# Patient Record
Sex: MALE | Race: WHITE | NOT HISPANIC OR LATINO | Employment: OTHER | URBAN - METROPOLITAN AREA
[De-identification: names, ages, dates, MRNs, and addresses within clinical notes are randomized per-mention and may not be internally consistent; named-entity substitution may affect disease eponyms.]

---

## 2018-05-18 ENCOUNTER — TRANSFERRED RECORDS (OUTPATIENT)
Dept: HEALTH INFORMATION MANAGEMENT | Facility: CLINIC | Age: 33
End: 2018-05-18

## 2018-06-05 ENCOUNTER — OFFICE VISIT (OUTPATIENT)
Dept: FAMILY MEDICINE | Facility: CLINIC | Age: 33
End: 2018-06-05
Payer: COMMERCIAL

## 2018-06-05 VITALS
WEIGHT: 181 LBS | DIASTOLIC BLOOD PRESSURE: 67 MMHG | BODY MASS INDEX: 25.91 KG/M2 | HEIGHT: 70 IN | TEMPERATURE: 96.8 F | SYSTOLIC BLOOD PRESSURE: 97 MMHG | OXYGEN SATURATION: 97 %

## 2018-06-05 DIAGNOSIS — Z01.818 PREOP GENERAL PHYSICAL EXAM: Primary | ICD-10-CM

## 2018-06-05 DIAGNOSIS — Z23 NEED FOR VACCINATION: ICD-10-CM

## 2018-06-05 DIAGNOSIS — Q18.1 CYST OF EAR CANAL: ICD-10-CM

## 2018-06-05 PROCEDURE — 99203 OFFICE O/P NEW LOW 30 MIN: CPT | Mod: 25 | Performed by: NURSE PRACTITIONER

## 2018-06-05 PROCEDURE — 90471 IMMUNIZATION ADMIN: CPT | Performed by: NURSE PRACTITIONER

## 2018-06-05 PROCEDURE — 90714 TD VACC NO PRESV 7 YRS+ IM: CPT | Performed by: NURSE PRACTITIONER

## 2018-06-05 RX ORDER — ALPRAZOLAM 0.25 MG
0.25 TABLET ORAL DAILY PRN
COMMUNITY
Start: 2016-02-04 | End: 2020-01-27

## 2018-06-05 RX ORDER — ISOTRETINOIN 40 MG/1
120 CAPSULE ORAL DAILY
COMMUNITY
End: 2020-01-27

## 2018-06-05 NOTE — PROGRESS NOTES
[place] [address] [facility] [address]  [phone]  Dept: [phone]    PRE-OP EVALUATION:  Today's date: 2018    Romario Horta (: 1985) presents for pre-operative evaluation assessment as requested by Dr. Sanjay Blackmon.  He requires evaluation and anesthesia risk assessment prior to undergoing surgery/procedure for treatment of cyst.    Proposed Surgery/ Procedure: remove cyst right earlobe  Date of Surgery/ Procedure: 2018  Time of Surgery/ Procedure:   Hospital/Surgical Facility: [facility]  Fax number for surgical facility: [phone]  Primary Physician: Ania Cortez  Type of Anesthesia Anticipated: to be determined    Patient has a Health Care Directive or Living Will:  NO    1. NO - Do you have a history of heart attack, stroke, stent, bypass or surgery on an artery in the head, neck, heart or legs?  2. NO - Do you ever have any pain or discomfort in your chest?  3. NO - Do you have a history of  Heart Failure?  4. NO - Are you troubled by shortness of breath when: walking on the level, up a slight hill or at night?  5. NO - Do you currently have a cold, bronchitis or other respiratory infection?  6. NO - Do you have a cough, shortness of breath or wheezing?  7. NO - Do you sometimes get pains in the calves of your legs when you walk?  8. NO - Do you or anyone in your family have previous history of blood clots?  9. NO - Do you or does anyone in your family have a serious bleeding problem such as prolonged bleeding following surgeries or cuts?  10. NO - Have you ever had problems with anemia or been told to take iron pills?  11. NO - Have you had any abnormal blood loss such as black, tarry or bloody stools, or abnormal vaginal bleeding?  12. NO - Have you ever had a blood transfusion?  13. NO - Have you or any of your relatives ever had problems with anesthesia?  14. NO - Do you have sleep apnea, excessive snoring or daytime  "drowsiness?  15. NO - Do you have any prosthetic heart valves?  16. NO - Do you have prosthetic joints?  17. NO - Is there any chance that you may be pregnant?      HPI:     HPI related to upcoming procedure: cyst present for a couple weeks in right ear canal. IS currently on antibiotics.   Has been on acutane for about 3 months     See problem list for active medical problems.  Problems all longstanding and stable, except as noted/documented.  See ROS for pertinent symptoms related to these conditions.                                                                                                                                                          .    MEDICAL HISTORY:     Patient Active Problem List    Diagnosis Date Noted     Cerumen impaction      Priority: Medium      Past Medical History:   Diagnosis Date     Cerumen impaction      No past surgical history on file.  No current outpatient prescriptions on file.     OTC products: None, except as noted above    Allergies   Allergen Reactions     No Known Allergies       Latex Allergy: NO    Social History   Substance Use Topics     Smoking status: Never Smoker     Smokeless tobacco: Not on file     Alcohol use No     History   Drug Use No       REVIEW OF SYSTEMS:   Constitutional, HEENT, cardiovascular, pulmonary, gi and gu systems are negative, except as otherwise noted.    EXAM:   BP 97/67 (Cuff Size: Adult Regular)  Temp 96.8  F (36  C) (Tympanic)  Ht 5' 10\" (1.778 m)  Wt 181 lb (82.1 kg)  SpO2 97%  BMI 25.97 kg/m2  GENERAL APPEARANCE: healthy, alert and no distress  HENT: ear canals and TM's normal and nose and mouth without ulcers or lesions--other than tiny cyst in right ear canal at entrance  RESP: lungs clear to auscultation - no rales, rhonchi or wheezes  CV: regular rate and rhythm, normal S1 S2, no S3 or S4 and no murmur, click or rub   NEURO: Normal strength and tone, sensory exam grossly normal, mentation intact and speech " normal    DIAGNOSTICS:   No labs or EKG required for low risk surgery (cataract, skin procedure, breast biopsy, etc)    Recent Labs   Lab Test  04/08/15   0834   HGB  15.0   PLT  253   NA  140   POTASSIUM  3.8   CR  0.99        IMPRESSION:   Reason for surgery/procedure: cyst in right ear  Diagnosis/reason for consult: medical preop    The proposed surgical procedure is considered LOW risk.    REVISED CARDIAC RISK INDEX  The patient has the following serious cardiovascular risks for perioperative complications such as (MI, PE, VFib and 3  AV Block):  No serious cardiac risks  INTERPRETATION: 0 risks: Class I (very low risk - 0.4% complication rate)    The patient has the following additional risks for perioperative complications:  No identified additional risks      ICD-10-CM    1. Preop general physical exam Z01.818    2. Cyst of ear canal Q18.1    3. Need for vaccination Z23 TD PRSERV FREE >=7 YRS ADS IM [54693]     1st  Administration  [48027]       RECOMMENDATIONS:         --Patient is to take all scheduled medications on the day of surgery EXCEPT for modifications listed below.      APPROVAL GIVEN to proceed with proposed procedure, without further diagnostic evaluation       Signed Electronically by: HARISH House CNP    Copy of this evaluation report is provided to requesting physician.    Ratna Preop Guidelines    Revised Cardiac Risk Index

## 2018-06-05 NOTE — MR AVS SNAPSHOT
After Visit Summary   6/5/2018    Romario Horta    MRN: [id number]           Patient Information     Date Of Birth          1985        Visit Information        Provider Department      6/5/2018 9:00 AM Rosemary Ritchie APRN CNP [facility]        Today's Diagnoses     Preop general physical exam    -  1    Cyst of ear canal        Need for vaccination          Care Instructions      Before Your Surgery      Call your surgeon if there is any change in your health. This includes signs of a cold or flu (such as a sore throat, runny nose, cough, rash or fever).    Do not smoke, drink alcohol or take over the counter medicine (unless your surgeon or primary care doctor tells you to) for the 24 hours before and after surgery.    If you take prescribed drugs: Follow your doctor s orders about which medicines to take and which to stop until after surgery.    Eating and drinking prior to surgery: follow the instructions from your surgeon    Take a shower or bath the night before surgery. Use the soap your surgeon gave you to gently clean your skin. If you do not have soap from your surgeon, use your regular soap. Do not shave or scrub the surgery site.  Wear clean pajamas and have clean sheets on your bed.           Follow-ups after your visit        Who to contact     If you have questions or need follow up information about today's clinic visit or your schedule please contact [facility] directly at [phone].  Normal or non-critical lab and imaging results will be communicated to you by MyChart, letter or phone within 4 business days after the clinic has received the results. If you do not hear from us within 7 days, please contact the clinic through MyChart or phone. If you have a critical or abnormal lab result, we will notify you by phone as soon as possible.  Submit refill requests through [employer] or call your pharmacy and they will forward the refill request to us.  "Please allow 3 business days for your refill to be completed.          Additional Information About Your Visit        MyChart Information     [employer] lets you send messages to your doctor, view your test results, renew your prescriptions, schedule appointments and more. To sign up, go to [email or web address] . Click on \"Log in\" on the left side of the screen, which will take you to the Welcome page. Then click on \"Sign up Now\" on the right side of the page.     You will be asked to enter the access code listed below, as well as some personal information. Please follow the directions to create your username and password.     Your access code is: [email or web address]  Expires: 9/3/2018  9:35 AM     Your access code will  in 90 days. If you need help or a new code, please call your [place] clinic or [phone].        Care EveryWhere ID     This is your [facility] EveryWhere ID. This could be used by other organizations to access your [place] medical records  [id number]        Your Vitals Were     Temperature Height Pulse Oximetry BMI (Body Mass Index)          96.8  F (36  C) (Tympanic) 5' 10\" (1.778 m) 97% 25.97 kg/m2         Blood Pressure from Last 3 Encounters:   18 97/67   10/01/14 103/64    Weight from Last 3 Encounters:   18 181 lb (82.1 kg)   10/01/14 171 lb (77.6 kg)              We Performed the Following     1st  Administration  [[id number]]     TD PRSERV FREE >=7 YRS ADS IM [[id number]]        Primary Care Provider Office Phone # Fax #    Ania Chanel DO [place] [phone] [address]  [place] [address]        Equal Access to Services     RAYNA ZEPEDA AH: Hadii luciano Echevarria, watamela polanco, qaneida kaalmada marge, barbara guajardo. So [facility] [phone].    ATENCIÓN: Si habla español, tiene a bustos disposición servicios gratuitos de asistencia lingüística. Llame al [phone].    We comply with applicable federal civil rights laws and " [place] laws. We do not discriminate on the basis of race, color, national origin, age, disability, sex, sexual orientation, or gender identity.            Thank you!     Thank you for choosing [facility]  for your care. Our goal is always to provide you with excellent care. Hearing back from our patients is one way we can continue to improve our services. Please take a few minutes to complete the written survey that you may receive in the mail after your visit with us. Thank you!             Your Updated Medication List - Protect others around you: Learn how to safely use, store and throw away your medicines at www.disposemymeds.org.          This list is accurate as of 6/5/18  9:35 AM.  Always use your most recent med list.                   Brand Name Dispense Instructions for use Diagnosis    ALPRAZolam 0.25 MG tablet    XANAX     Take 0.25 mg by mouth daily as needed        BACTRIM PO      Take 2 tablets by mouth 2 times daily        ISOtretinoin 40 MG capsule    ACCUTANE     Take 120 mg by mouth daily        XYZAL ALLERGY 24HR PO      Take 1 capsule by mouth daily as needed

## 2018-06-20 ENCOUNTER — TRANSFERRED RECORDS (OUTPATIENT)
Dept: HEALTH INFORMATION MANAGEMENT | Facility: CLINIC | Age: 33
End: 2018-06-20

## 2018-08-14 ENCOUNTER — HOSPITAL ENCOUNTER (EMERGENCY)
Facility: CLINIC | Age: 33
Discharge: HOME | End: 2018-08-15
Attending: EMERGENCY MEDICINE | Admitting: EMERGENCY MEDICINE
Payer: COMMERCIAL

## 2018-08-14 DIAGNOSIS — R07.9 CHEST PAIN, UNSPECIFIED TYPE: ICD-10-CM

## 2018-08-14 DIAGNOSIS — H92.01 EARACHE, RIGHT: ICD-10-CM

## 2018-08-14 LAB — INTERPRETATION ECG - MUSE: NORMAL

## 2018-08-14 PROCEDURE — 93005 ELECTROCARDIOGRAM TRACING: CPT

## 2018-08-14 PROCEDURE — 99285 EMERGENCY DEPT VISIT HI MDM: CPT | Mod: 25

## 2018-08-14 NOTE — ED AVS SNAPSHOT
Emergency Department    [address] [place] [address]    Phone:  [phone]    Fax:  [phone]                                       Romario Horta   MRN: [id number]    Department:   Emergency Department   Date of Visit:  8/14/2018           After Visit Summary Signature Page     I have received my discharge instructions, and my questions have been answered. I have discussed any challenges I see with this plan with the nurse or doctor.    ..........................................................................................................................................  Patient/Patient Representative Signature      ..........................................................................................................................................  Patient Representative Print Name and Relationship to Patient    ..................................................               ................................................  Date                                            Time    ..........................................................................................................................................  Reviewed by Signature/Title    ...................................................              ..............................................  Date                                                            Time

## 2018-08-14 NOTE — ED AVS SNAPSHOT
Emergency Department    [address] [place] [address]    Phone:  [phone]    Fax:  [phone]                                       Romario Horta   MRN: [id number]    Department:   Emergency Department   Date of Visit:  8/14/2018           Patient Information     Date Of Birth          1985        Your diagnoses for this visit were:     Chest pain, unspecified type     Earache, right        You were seen by Jose Boykin MD.      Follow-up Information     Please follow up.    Why:  return here if any concerns        Discharge Instructions          *CHEST PAIN, UNCERTAIN CAUSE    Based on your exam today, the exact cause of your chest pain is not certain. Your condition does not seem serious at this time, and your pain does not appear to be coming from your heart. However, sometimes the signs of a serious problem take more time to appear. Therefore, watch for the warning signs listed below.  HOME CARE:    1. Rest today and avoid strenuous activity.  2. Take any prescribed medicine as directed.  FOLLOW UP with your doctor in 1-3 days.   GET PROMPT MEDICAL ATTENTION if any of the following occur:    A change in the type of pain: if it feels different, becomes more severe, lasts longer, or begins to spread into your shoulder, arm, neck, jaw or back    Shortness of breath or increased pain with breathing    Weakness, dizziness, or fainting    Cough with blood or dark colored sputum (phlegm)    Fever over 101  F (38.3  C)    Swelling, pain or redness in one leg    7212-1957 [employer]. [address] [place] [address]. All rights reserved. This information is not intended as a substitute for professional medical care. Always follow your healthcare professional's instructions.  This information has been modified by your health care provider with permission from the publisher.        24 Hour Appointment Hotline       To make an appointment at any [facility]  clinic, call [phone] ([phone]). If you don't have a family doctor or clinic, we will help you find one. [facility] clinics are conveniently located to serve the needs of you and your family.             Review of your medicines      Our records show that you are taking the medicines listed below. If these are incorrect, please call your family doctor or clinic.        Dose / Directions Last dose taken    ALPRAZolam 0.25 MG tablet   Commonly known as:  XANAX   Dose:  0.25 mg        Take 0.25 mg by mouth daily as needed   Refills:  0        BACTRIM PO   Dose:  2 tablet        Take 2 tablets by mouth 2 times daily   Refills:  0        ISOtretinoin 40 MG capsule   Commonly known as:  ACCUTANE   Dose:  120 mg        Take 120 mg by mouth daily   Refills:  0        XYZAL ALLERGY 24HR PO   Dose:  1 capsule        Take 1 capsule by mouth daily as needed   Refills:  0                Procedures and tests performed during your visit     Basic metabolic panel    CBC with platelets differential    Chest XR,  PA & LAT    D dimer quantitative    EKG 12-lead, tracing only    Peripheral IV: Standard    Troponin I      Orders Needing Specimen Collection     None      Pending Results     Date and Time Order Name Status Description    8/15/2018 0038 Chest XR,  PA & LAT Preliminary             Pending Culture Results     No orders found for last 3 day(s).            Pending Results Instructions     If you had any lab results that were not finalized at the time of your Discharge, you can call the ED Lab Result RN at [phone]. You will be contacted by this team for any positive Lab results or changes in treatment. The nurses are available 7 days a week from 10A to 6:30P.  You can leave a message 24 hours per day and they will return your call.        Test Results From Your Hospital Stay        8/15/2018 12:20 AM      Component Results     Component Value Ref Range & Units Status    WBC 6.6 4.0 - 11.0 10e9/L Final    RBC  Count 4.83 4.4 - 5.9 10e12/L Final    Hemoglobin 14.0 13.3 - 17.7 g/dL Final    Hematocrit 41.6 40.0 - 53.0 % Final    MCV 86 78 - 100 fl Final    MCH 29.0 26.5 - 33.0 pg Final    MCHC 33.7 31.5 - 36.5 g/dL Final    RDW 12.8 10.0 - 15.0 % Final    Platelet Count 188 150 - 450 10e9/L Final    Diff Method Automated Method  Final    % Neutrophils 70.7 % Final    % Lymphocytes 19.1 % Final    % Monocytes 8.2 % Final    % Eosinophils 1.7 % Final    % Basophils 0.3 % Final    % Immature Granulocytes 0.0 % Final    Nucleated RBCs 0 0 /100 Final    Absolute Neutrophil 4.7 1.6 - 8.3 10e9/L Final    Absolute Lymphocytes 1.3 0.8 - 5.3 10e9/L Final    Absolute Monocytes 0.5 0.0 - 1.3 10e9/L Final    Absolute Eosinophils 0.1 0.0 - 0.7 10e9/L Final    Absolute Basophils 0.0 0.0 - 0.2 10e9/L Final    Abs Immature Granulocytes 0.0 0 - 0.4 10e9/L Final    Absolute Nucleated RBC 0.0  Final         8/15/2018 12:43 AM      Component Results     Component Value Ref Range & Units Status    Sodium 141 133 - 144 mmol/L Final    Potassium 3.5 3.4 - 5.3 mmol/L Final    Chloride 107 94 - 109 mmol/L Final    Carbon Dioxide 24 20 - 32 mmol/L Final    Anion Gap 10 3 - 14 mmol/L Final    Glucose 125 (H) 70 - 99 mg/dL Final    Urea Nitrogen 16 7 - 30 mg/dL Final    Creatinine 0.95 0.66 - 1.25 mg/dL Final    GFR Estimate >90 >60 mL/min/1.7m2 Final    Non  GFR Calc    GFR Estimate If Black >90 >60 mL/min/1.7m2 Final    African American GFR Calc    Calcium 8.2 (L) 8.5 - 10.1 mg/dL Final         8/15/2018 12:47 AM      Component Results     Component Value Ref Range & Units Status    Troponin I ES <0.015 0.000 - 0.045 ug/L Final    The 99th percentile for upper reference range is 0.045 ug/L.  Troponin values   in the range of 0.045 - 0.120 ug/L may be associated with risks of adverse   clinical events.           8/15/2018 12:36 AM      Component Results     Component Value Ref Range & Units Status    D Dimer <0.3 0.0 - 0.50 ug/ml FEU  Final    This D-dimer assay is intended for use in conjunction with a clinical pretest   probability assessment model to exclude pulmonary embolism (PE) and deep   venous thrombosis (DVT) in outpatients suspected of PE or DVT. The cut-off   value is 0.5 ug/mL FEU.           8/15/2018  1:10 AM      Narrative     CHEST 2 VIEWS   8/15/2018 1:01 AM     HISTORY: Chest pain.    COMPARISON: None.    FINDINGS: The lungs are clear. Normal-sized cardiac silhouette.        Impression     IMPRESSION: No evidence of active cardiopulmonary disease.                Clinical Quality Measure: Blood Pressure Screening     Your blood pressure was checked while you were in the emergency department today. The last reading we obtained was  BP: (!) 131/94 . Please read the guidelines below about what these numbers mean and what you should do about them.  If your systolic blood pressure (the top number) is less than 120 and your diastolic blood pressure (the bottom number) is less than 80, then your blood pressure is normal. There is nothing more that you need to do about it.  If your systolic blood pressure (the top number) is 120-139 or your diastolic blood pressure (the bottom number) is 80-89, your blood pressure may be higher than it should be. You should have your blood pressure rechecked within a year by a primary care provider.  If your systolic blood pressure (the top number) is 140 or greater or your diastolic blood pressure (the bottom number) is 90 or greater, you may have high blood pressure. High blood pressure is treatable, but if left untreated over time it can put you at risk for heart attack, stroke, or kidney failure. You should have your blood pressure rechecked by a primary care provider within the next 4 weeks.  If your provider in the emergency department today gave you specific instructions to follow-up with your doctor or provider even sooner than that, you should follow that instruction and not wait for up to 4  "weeks for your follow-up visit.        Thank you for choosing [facility]       Thank you for choosing [facility] for your care. Our goal is always to provide you with excellent care. Hearing back from our patients is one way we can continue to improve our services. Please take a few minutes to complete the written survey that you may receive in the mail after you visit with us. Thank you!        [facility] [employer] Information     [employer] lets you send messages to your doctor, view your test results, renew your prescriptions, schedule appointments and more. To sign up, go to [email or web address] . Click on \"Log in\" on the left side of the screen, which will take you to the Welcome page. Then click on \"Sign up Now\" on the right side of the page.     You will be asked to enter the access code listed below, as well as some personal information. Please follow the directions to create your username and password.     Your access code is: [email or web address]  Expires: 9/3/2018  9:35 AM     Your access code will  in 90 days. If you need help or a new code, please call your [facility] clinic or [phone].        Care EveryWhere ID     This is your Care EveryWhere ID. This could be used by other organizations to access your [facility] medical records  [id number]        Equal Access to Services     RAYNA ZEPEDA : Tamia Echevarria, waaxda luqadaha, qaybta kaalmada adeyasmeenyada, barbara guajardo. So [facility] [phone].    ATENCIÓN: Si habla español, tiene a bustos disposición servicios gratuitos de asistencia lingüística. Llame al [phone].    We comply with applicable federal civil rights laws and [place] laws. We do not discriminate on the basis of race, color, national origin, age, disability, sex, sexual orientation, or gender identity.            After Visit Summary       This is your record. Keep this with you and show to your community pharmacist(s) and doctor(s) at your next visit.                "

## 2018-08-15 ENCOUNTER — APPOINTMENT (OUTPATIENT)
Dept: GENERAL RADIOLOGY | Facility: CLINIC | Age: 33
End: 2018-08-15
Attending: EMERGENCY MEDICINE
Payer: COMMERCIAL

## 2018-08-15 VITALS
HEIGHT: 71 IN | HEART RATE: 97 BPM | SYSTOLIC BLOOD PRESSURE: 131 MMHG | DIASTOLIC BLOOD PRESSURE: 94 MMHG | TEMPERATURE: 99 F | BODY MASS INDEX: 25.2 KG/M2 | RESPIRATION RATE: 20 BRPM | WEIGHT: 180 LBS | OXYGEN SATURATION: 95 %

## 2018-08-15 LAB
ANION GAP SERPL CALCULATED.3IONS-SCNC: 10 MMOL/L (ref 3–14)
BASOPHILS # BLD AUTO: 0 10E9/L (ref 0–0.2)
BASOPHILS NFR BLD AUTO: 0.3 %
BUN SERPL-MCNC: 16 MG/DL (ref 7–30)
CALCIUM SERPL-MCNC: 8.2 MG/DL (ref 8.5–10.1)
CHLORIDE SERPL-SCNC: 107 MMOL/L (ref 94–109)
CO2 SERPL-SCNC: 24 MMOL/L (ref 20–32)
CREAT SERPL-MCNC: 0.95 MG/DL (ref 0.66–1.25)
D DIMER PPP FEU-MCNC: <0.3 UG/ML FEU (ref 0–0.5)
DIFFERENTIAL METHOD BLD: NORMAL
EOSINOPHIL # BLD AUTO: 0.1 10E9/L (ref 0–0.7)
EOSINOPHIL NFR BLD AUTO: 1.7 %
ERYTHROCYTE [DISTWIDTH] IN BLOOD BY AUTOMATED COUNT: 12.8 % (ref 10–15)
GFR SERPL CREATININE-BSD FRML MDRD: >90 ML/MIN/1.7M2
GLUCOSE SERPL-MCNC: 125 MG/DL (ref 70–99)
HCT VFR BLD AUTO: 41.6 % (ref 40–53)
HGB BLD-MCNC: 14 G/DL (ref 13.3–17.7)
IMM GRANULOCYTES # BLD: 0 10E9/L (ref 0–0.4)
IMM GRANULOCYTES NFR BLD: 0 %
LYMPHOCYTES # BLD AUTO: 1.3 10E9/L (ref 0.8–5.3)
LYMPHOCYTES NFR BLD AUTO: 19.1 %
MCH RBC QN AUTO: 29 PG (ref 26.5–33)
MCHC RBC AUTO-ENTMCNC: 33.7 G/DL (ref 31.5–36.5)
MCV RBC AUTO: 86 FL (ref 78–100)
MONOCYTES # BLD AUTO: 0.5 10E9/L (ref 0–1.3)
MONOCYTES NFR BLD AUTO: 8.2 %
NEUTROPHILS # BLD AUTO: 4.7 10E9/L (ref 1.6–8.3)
NEUTROPHILS NFR BLD AUTO: 70.7 %
NRBC # BLD AUTO: 0 10*3/UL
NRBC BLD AUTO-RTO: 0 /100
PLATELET # BLD AUTO: 188 10E9/L (ref 150–450)
POTASSIUM SERPL-SCNC: 3.5 MMOL/L (ref 3.4–5.3)
RBC # BLD AUTO: 4.83 10E12/L (ref 4.4–5.9)
SODIUM SERPL-SCNC: 141 MMOL/L (ref 133–144)
TROPONIN I SERPL-MCNC: <0.015 UG/L (ref 0–0.04)
WBC # BLD AUTO: 6.6 10E9/L (ref 4–11)

## 2018-08-15 PROCEDURE — 84484 ASSAY OF TROPONIN QUANT: CPT | Performed by: EMERGENCY MEDICINE

## 2018-08-15 PROCEDURE — 85379 FIBRIN DEGRADATION QUANT: CPT | Performed by: EMERGENCY MEDICINE

## 2018-08-15 PROCEDURE — 85025 COMPLETE CBC W/AUTO DIFF WBC: CPT | Performed by: EMERGENCY MEDICINE

## 2018-08-15 PROCEDURE — 71046 X-RAY EXAM CHEST 2 VIEWS: CPT

## 2018-08-15 PROCEDURE — 80048 BASIC METABOLIC PNL TOTAL CA: CPT | Performed by: EMERGENCY MEDICINE

## 2018-08-15 ASSESSMENT — ENCOUNTER SYMPTOMS
LIGHT-HEADEDNESS: 1
SHORTNESS OF BREATH: 1
NAUSEA: 0
MYALGIAS: 0
DIARRHEA: 0
VOMITING: 0
CONSTIPATION: 0
BLOOD IN STOOL: 0
ABDOMINAL PAIN: 0

## 2018-08-15 NOTE — ED PROVIDER NOTES
"  History   Chief Complaint:  Chest Pain     HPI   Romario Horta is an otherwise healthy 33 year old male who presents with chest pain. The patient reports that throughout the day he has had intermittent and alternating episodes of chills and diaphoreris, and has had a fever. At 1900 tonight, he states that he suddenly developed chest pain, lightheadedness, and shortness of breath directly after standing up. He notes that these symptoms are exacerbated when standing up. He describes the pain as being \"achy\" and constant diffusely throughout most of the chest. He adds that two hours ago he developed pain in his right ear. He did take ibuprofen for the symptoms, but reports that this did not help. The patient has not had any similar symptoms in the past. He does attest to having a stomach illness two weeks ago, although these symptoms have resolved. He traveled to [place] via airplane one week ago, although the travel time is only about one hour. The patient's grandfather  of a heart attack according to the patient's mother. The patient denies smoking, exercising, swelling to the arms or legs, pain to the arms or legs, abdominal pain, irregular bowel movements, nausea, and vomiting. He has no history of hypertension, hyperlipidemia, diabetes, and blood clots.     CARDIAC RISK FACTORS:  Sex:    Male  Tobacco:   No  Hypertension:   No  Hyperlipidemia:  No  Diabetes:   No  Family History:  Yes    PE/DVT RISK FACTORS:  Sex:    Male  Hormones:   No  Tobacco:   No  Cancer:   No  Travel:   Yes, one hour plane ride  Surgery:   No  Other immobilization: No  Personal history:  No  Family history:  No    Allergies:  No known drug allergies    Medications:    Xanax  Accutane    Past Medical History:    Cerumen impaction  Anxiety    Past Surgical History:    History reviewed. No pertinent surgical history.    Family History:    History reviewed. No pertinent family history.     Social History:  Smoking status: Never " "smoker  Alcohol use: Yes  Marital Status:   [2]    Review of Systems   HENT: Positive for ear pain (right ear).    Respiratory: Positive for shortness of breath.    Cardiovascular: Positive for chest pain. Negative for leg swelling.   Gastrointestinal: Negative for abdominal pain, blood in stool, constipation, diarrhea, nausea and vomiting.   Musculoskeletal: Negative for myalgias (arms or legs).   Neurological: Positive for light-headedness.   All other systems reviewed and are negative.    Physical Exam   Patient Vitals for the past 24 hrs:   BP Temp Temp src Pulse Heart Rate Resp SpO2 Height Weight   08/15/18 0134 (!) 131/94 - - - 86 20 - - -   08/15/18 0025 - - - - 95 10 95 % - -   08/15/18 0015 - - - - 95 19 96 % - -   08/14/18 2349 - - - - - - - 1.803 m (5' 11\") 81.6 kg (180 lb)   08/14/18 2342 (!) 131/94 99  F (37.2  C) Oral 97 - 20 - - -     Physical Exam  SKIN:  Warm, dry.  HEMATOLOGIC/IMMUNOLOGIC/LYMPHATIC:  No pallor. No limb edema.  HENT:  No JVD.  Right ear exam limited by cerumen obscuring the TM.    CARDIOVASCULAR:  Regular rate and rhythm, no murmur.  Radial and pedal pulses equal and normal.  RESPIRATORY:  No respiratory distress, breath sounds equal and normal.  GASTROINTESTINAL:  Soft, nontender abdomen.  No mass or distension.  MUSCULOSKELETAL:  Non-tender chest wall.  Normal body habitus.  NEUROLOGIC:  Alert, conversant.  PSYCHIATRIC:  Normal mood.    Emergency Department Course   ECG (23:39:53):  Rate 98 bpm. WY interval 150. QRS duration 104. QT/QTc 334/426. P-R-T axes 24 3 24. Normal sinus rhythm. Normal ECG. Interpreted at 2343 by Jose Boykin MD.    Imaging:  Radiographic findings were communicated with the patient who voiced understanding of the findings.    Chest XR, PA & LAT:  No evidence of active cardiopulmonary disease.  As read by Radiology.     Laboratory:  CBC: WNL (WBC 6.6, HGB 14.0, )  BMP:  (H), Calcium 8.2 (L) o/w WNL (Creatinine 0.95)  Troponin: " <0.015  D-dimer: <0.3    Emergency Department Course:  Past medical records, nursing notes, and vitals reviewed.  2344: I performed an exam of the patient and obtained history, as documented above.  IV inserted and blood drawn.  EKG obtained, results above.  The patient was sent for a chest x-ray, PA, and LAT while in the emergency department, findings above.    0059: I rechecked the patient. Explained findings to patient.    0012: I rechecked the patient. Explained x-ray findings to the patient.    Findings and plan explained to the patient. Patient discharged home with instructions regarding supportive care, medications, and reasons to return. The importance of close follow-up was reviewed.     Impression & Plan    Medical Decision Making:  Romario Horta is a 33 year old male who presents with chest discomfort that he describes as being worsened when he changes positions, specifically rising. Also complaining of some sort of right ear discomfort. The ear exam is somewhat limited as there is a fair amount of cerumen obscuring the tympanic membrane. The above evaluation otherwise was reassuring with respect to chest discomfort. Given the positional nature and negative workup I did not think he required further evaluation. Advised to return if any concerns with respect to either issue.     Diagnosis:    ICD-10-CM   1. Chest pain, unspecified type R07.9   2. Earache, right H92.01       Disposition:  Discharged to home.      John Richardson  8/14/2018    EMERGENCY DEPARTMENT  I, John Richardson, am serving as a scribe at 11:44 PM on 8/14/2018 to document services personally performed by Jose Boykin MD based on my observations and the provider's statements to me.        Jose Boykin MD  08/15/18 0329

## 2018-08-15 NOTE — DISCHARGE INSTRUCTIONS
*CHEST PAIN, UNCERTAIN CAUSE    Based on your exam today, the exact cause of your chest pain is not certain. Your condition does not seem serious at this time, and your pain does not appear to be coming from your heart. However, sometimes the signs of a serious problem take more time to appear. Therefore, watch for the warning signs listed below.  HOME CARE:    1. Rest today and avoid strenuous activity.  2. Take any prescribed medicine as directed.  FOLLOW UP with your doctor in 1-3 days.   GET PROMPT MEDICAL ATTENTION if any of the following occur:    A change in the type of pain: if it feels different, becomes more severe, lasts longer, or begins to spread into your shoulder, arm, neck, jaw or back    Shortness of breath or increased pain with breathing    Weakness, dizziness, or fainting    Cough with blood or dark colored sputum (phlegm)    Fever over 101  F (38.3  C)    Swelling, pain or redness in one leg    5401-2396 [employer] [facility]. [address] [place] [address]. All rights reserved. This information is not intended as a substitute for professional medical care. Always follow your healthcare professional's instructions.  This information has been modified by your health care provider with permission from the publisher.

## 2018-08-16 ENCOUNTER — OFFICE VISIT (OUTPATIENT)
Dept: FAMILY MEDICINE | Facility: CLINIC | Age: 33
End: 2018-08-16
Payer: COMMERCIAL

## 2018-08-16 VITALS
SYSTOLIC BLOOD PRESSURE: 127 MMHG | OXYGEN SATURATION: 97 % | HEIGHT: 71 IN | HEART RATE: 101 BPM | BODY MASS INDEX: 25.48 KG/M2 | TEMPERATURE: 100.3 F | WEIGHT: 182 LBS | DIASTOLIC BLOOD PRESSURE: 77 MMHG

## 2018-08-16 DIAGNOSIS — R07.9 ACUTE CHEST PAIN: ICD-10-CM

## 2018-08-16 DIAGNOSIS — R50.9 FEVER, UNSPECIFIED FEVER CAUSE: Primary | ICD-10-CM

## 2018-08-16 LAB
ALBUMIN SERPL-MCNC: 3.9 G/DL (ref 3.4–5)
ALP SERPL-CCNC: 99 U/L (ref 40–150)
ALT SERPL W P-5'-P-CCNC: 22 U/L (ref 0–70)
ANION GAP SERPL CALCULATED.3IONS-SCNC: 9 MMOL/L (ref 3–14)
AST SERPL W P-5'-P-CCNC: 20 U/L (ref 0–45)
BASOPHILS # BLD AUTO: 0 10E9/L (ref 0–0.2)
BASOPHILS NFR BLD AUTO: 0.2 %
BILIRUB SERPL-MCNC: 0.5 MG/DL (ref 0.2–1.3)
BUN SERPL-MCNC: 13 MG/DL (ref 7–30)
CALCIUM SERPL-MCNC: 8.9 MG/DL (ref 8.5–10.1)
CHLORIDE SERPL-SCNC: 105 MMOL/L (ref 94–109)
CO2 SERPL-SCNC: 26 MMOL/L (ref 20–32)
CREAT SERPL-MCNC: 1.1 MG/DL (ref 0.66–1.25)
DIFFERENTIAL METHOD BLD: NORMAL
EOSINOPHIL # BLD AUTO: 0 10E9/L (ref 0–0.7)
EOSINOPHIL NFR BLD AUTO: 0.3 %
ERYTHROCYTE [DISTWIDTH] IN BLOOD BY AUTOMATED COUNT: 13.3 % (ref 10–15)
GFR SERPL CREATININE-BSD FRML MDRD: 76 ML/MIN/1.7M2
GLUCOSE SERPL-MCNC: 96 MG/DL (ref 70–99)
HCT VFR BLD AUTO: 46.2 % (ref 40–53)
HGB BLD-MCNC: 15 G/DL (ref 13.3–17.7)
LYMPHOCYTES # BLD AUTO: 0.8 10E9/L (ref 0.8–5.3)
LYMPHOCYTES NFR BLD AUTO: 13.1 %
MCH RBC QN AUTO: 29.4 PG (ref 26.5–33)
MCHC RBC AUTO-ENTMCNC: 32.5 G/DL (ref 31.5–36.5)
MCV RBC AUTO: 90 FL (ref 78–100)
MONOCYTES # BLD AUTO: 0.6 10E9/L (ref 0–1.3)
MONOCYTES NFR BLD AUTO: 9.5 %
NEUTROPHILS # BLD AUTO: 4.5 10E9/L (ref 1.6–8.3)
NEUTROPHILS NFR BLD AUTO: 76.9 %
PLATELET # BLD AUTO: 191 10E9/L (ref 150–450)
POTASSIUM SERPL-SCNC: 3.8 MMOL/L (ref 3.4–5.3)
PROT SERPL-MCNC: 7.3 G/DL (ref 6.8–8.8)
RBC # BLD AUTO: 5.1 10E12/L (ref 4.4–5.9)
SODIUM SERPL-SCNC: 140 MMOL/L (ref 133–144)
WBC # BLD AUTO: 5.9 10E9/L (ref 4–11)

## 2018-08-16 PROCEDURE — 85025 COMPLETE CBC W/AUTO DIFF WBC: CPT | Performed by: NURSE PRACTITIONER

## 2018-08-16 PROCEDURE — 93000 ELECTROCARDIOGRAM COMPLETE: CPT | Performed by: NURSE PRACTITIONER

## 2018-08-16 PROCEDURE — 80053 COMPREHEN METABOLIC PANEL: CPT | Performed by: NURSE PRACTITIONER

## 2018-08-16 PROCEDURE — 36415 COLL VENOUS BLD VENIPUNCTURE: CPT | Performed by: NURSE PRACTITIONER

## 2018-08-16 PROCEDURE — 86618 LYME DISEASE ANTIBODY: CPT | Performed by: NURSE PRACTITIONER

## 2018-08-16 PROCEDURE — 99214 OFFICE O/P EST MOD 30 MIN: CPT | Performed by: NURSE PRACTITIONER

## 2018-08-16 PROCEDURE — 87040 BLOOD CULTURE FOR BACTERIA: CPT | Performed by: NURSE PRACTITIONER

## 2018-08-16 PROCEDURE — 86666 EHRLICHIA ANTIBODY: CPT | Mod: 90 | Performed by: NURSE PRACTITIONER

## 2018-08-16 PROCEDURE — 99000 SPECIMEN HANDLING OFFICE-LAB: CPT | Performed by: NURSE PRACTITIONER

## 2018-08-16 ASSESSMENT — ENCOUNTER SYMPTOMS
COUGH: 0
SHORTNESS OF BREATH: 1
DIARRHEA: 0
HEADACHES: 1
MYALGIAS: 1
NECK PAIN: 0
ORTHOPNEA: 0
NAUSEA: 0
CHILLS: 1
CONSTIPATION: 1
SORE THROAT: 0
FEVER: 1

## 2018-08-16 ASSESSMENT — LIFESTYLE VARIABLES: SUBSTANCE_ABUSE: 0

## 2018-08-16 NOTE — MR AVS SNAPSHOT
"              After Visit Summary   2018    Romario Horta    MRN: [id number]           Patient Information     Date Of Birth          1985        Visit Information        Provider Department      2018 9:30 AM Rosemary Ritchie APRN CNP [facility]        Today's Diagnoses     Fever, unspecified fever cause    -  1    Acute chest pain           Follow-ups after your visit        Who to contact     If you have questions or need follow up information about today's clinic visit or your schedule please contact [facility] directly at [phone].  Normal or non-critical lab and imaging results will be communicated to you by Werdsmithhart, letter or phone within 4 business days after the clinic has received the results. If you do not hear from us within 7 days, please contact the clinic through Werdsmithhart or phone. If you have a critical or abnormal lab result, we will notify you by phone as soon as possible.  Submit refill requests through SocialFlow or call your pharmacy and they will forward the refill request to us. Please allow 3 business days for your refill to be completed.          Additional Information About Your Visit        MyChart Information     SocialFlow lets you send messages to your doctor, view your test results, renew your prescriptions, schedule appointments and more. To sign up, go to [email or web address] . Click on \"Log in\" on the left side of the screen, which will take you to the Welcome page. Then click on \"Sign up Now\" on the right side of the page.     You will be asked to enter the access code listed below, as well as some personal information. Please follow the directions to create your username and password.     Your access code is: [email or web address]  Expires: 9/3/2018  9:35 AM     Your access code will  in 90 days. If you need help or a new code, please call your [facility] or [phone].        Care EveryWhere ID     This is your Care EveryWhere ID. " "This could be used by other organizations to access your [place] medical records  [id number]        Your Vitals Were     Pulse Temperature Height Pulse Oximetry BMI (Body Mass Index)       101 100.3  F (37.9  C) (Tympanic) 5' 11\" (1.803 m) 97% 25.38 kg/m2        Blood Pressure from Last 3 Encounters:   08/16/18 127/77   08/15/18 (!) 131/94   06/05/18 97/67    Weight from Last 3 Encounters:   08/16/18 182 lb (82.6 kg)   08/14/18 180 lb (81.6 kg)   06/05/18 181 lb (82.1 kg)              We Performed the Following     Anaplasma phagocytoph antibody IgG IgM     Blood culture     Blood culture     CBC with platelets and differential     Comprehensive metabolic panel     EKG 12-lead complete w/read - Clinics     JUST IN CASE     Lyme Disease Tabitha with reflex to WB Serum          Today's Medication Changes          These changes are accurate as of 8/16/18  1:22 PM.  If you have any questions, ask your nurse or doctor.               Stop taking these medicines if you haven't already. Please contact your care team if you have questions.     BACTRIM PO   Stopped by:  Rosemary Ritchie APRN CNP                    Primary Care Provider Office Phone # Fax #    Ania Chanel Cortez,  [phone] [address] [address]        Equal Access to Services     RAYNA ZEPEDA : Hadii luciano redmond hadasho Soedmundali, waaxda luqadaha, qaybta kaalmada barbara bird . So [facility] [phone].    ATENCIÓN: Si habla español, tiene a bustos disposición servicios gratuitos de asistencia lingüística. Simi al [phone].    We comply with applicable federal civil rights laws and [place] laws. We do not discriminate on the basis of race, color, national origin, age, disability, sex, sexual orientation, or gender identity.            Thank you!     Thank you for choosing [facility]  for your care. Our goal is always to provide you with excellent care. Hearing back from " our patients is one way we can continue to improve our services. Please take a few minutes to complete the written survey that you may receive in the mail after your visit with us. Thank you!             Your Updated Medication List - Protect others around you: Learn how to safely use, store and throw away your medicines at www.disposemymeds.org.          This list is accurate as of 8/16/18  1:22 PM.  Always use your most recent med list.                   Brand Name Dispense Instructions for use Diagnosis    ALPRAZolam 0.25 MG tablet    XANAX     Take 0.25 mg by mouth daily as needed        ISOtretinoin 40 MG capsule    ACCUTANE     Take 120 mg by mouth daily        XYZAL ALLERGY 24HR PO      Take 1 capsule by mouth daily as needed

## 2018-08-16 NOTE — PROGRESS NOTES
HPI      SUBJECTIVE:   Romario Horta is a 33 year old male who presents to clinic today for the following health issues:    ED/UC Followup:    Facility:   ED  Date of visit: 8/14/2018  Reason for visit: Chest Pain, Ear Ache  Current Status: Has a fever, still has chest pain and ear ache, feels ill.        2 nights ago started getting chest/lung pain. Then shortly after started getting ear pain and chills.  Temp was 101.9   Went to ER    If he lays down he feels pretty good, but if he gets up then he gets the ear pain or the chest pain   Feels more sick today   Mild HA  Taking Ibuprofen and ASA   Mild SOB with ambulation and only with pain   CP was much worse yesterday but continues today   A couple weeks ago had a stomach virus    Past Medical History:   Diagnosis Date     Cerumen impaction      No family history on file.  No past surgical history on file.  Social History   Substance Use Topics     Smoking status: Never Smoker     Smokeless tobacco: Never Used     Alcohol use Yes      Comment: abt 2 drinks per week      Current Outpatient Prescriptions   Medication Sig Dispense Refill     ALPRAZolam (XANAX) 0.25 MG tablet Take 0.25 mg by mouth daily as needed       ISOtretinoin (ACCUTANE) 40 MG capsule Take 120 mg by mouth daily       Levocetirizine Dihydrochloride (XYZAL ALLERGY 24HR PO) Take 1 capsule by mouth daily as needed       Sulfamethoxazole-Trimethoprim (BACTRIM PO) Take 2 tablets by mouth 2 times daily       No Known Allergies    Reviewed and updated as needed this visit by clinical staff and provider       Review of Systems   Constitutional: Positive for chills and fever.   HENT: Positive for ear pain (right). Negative for sore throat.    Respiratory: Positive for shortness of breath (mild). Negative for cough.    Cardiovascular: Positive for chest pain. Negative for orthopnea.   Gastrointestinal: Positive for constipation (mild, chronic). Negative for diarrhea and nausea.   Genitourinary: Negative.   "  Musculoskeletal: Positive for myalgias. Negative for neck pain.   Skin: Negative for rash.   Neurological: Positive for headaches.   Endo/Heme/Allergies: Negative for environmental allergies (not currently).   Psychiatric/Behavioral: Negative for substance abuse.          /77 (BP Location: Right arm, Cuff Size: Adult Regular)  Pulse 101  Temp 100.3  F (37.9  C) (Tympanic)  Ht 5' 11\" (1.803 m)  Wt 182 lb (82.6 kg)  SpO2 97%  BMI 25.38 kg/m2      Physical Exam   Constitutional: He is well-developed, well-nourished, and in no distress.   HENT:   Head: Normocephalic.   Right Ear: Tympanic membrane, external ear and ear canal normal.   Left Ear: Tympanic membrane, external ear and ear canal normal.   Mouth/Throat: Oropharynx is clear and moist. No oropharyngeal exudate.   Eyes: Conjunctivae are normal.   Neck: Normal range of motion.   Cardiovascular: Normal rate, regular rhythm and normal heart sounds.    No murmur heard.  Pulmonary/Chest: Effort normal and breath sounds normal. No respiratory distress.   Abdominal: Soft. He exhibits no distension. There is no tenderness.   Musculoskeletal: Normal range of motion.   Lymphadenopathy:     He has no cervical adenopathy.   Neurological: He is alert.   Skin: Skin is warm and dry.   Psychiatric: Mood and affect normal.   Vitals reviewed.      Assessment and Plan:       ICD-10-CM    1. Fever, unspecified fever cause R50.9 CBC with platelets and differential     Lyme Disease Tabitha with reflex to WB Serum     Anaplasma phagocytoph antibody IgG IgM     JUST IN CASE     Blood culture     Blood culture     Comprehensive metabolic panel   2. Acute chest pain R07.9 EKG 12-lead complete w/read - Clinics     Comprehensive metabolic panel     Unknown etiology of fever, CP and right ear pain.  Yesterday was seen in ED where he had neg d-dimer, trop, EKG, labs and CXR.   This is  Unlikely myocarditis, pericarditis, PE, pneumonia with previous findings. Not c/w meningitis with " no neck pain and only a mild HA and his main complaint not c/w meningitis.   WBC today is normal. With high fever, blood cultures completed today    EKG without acute findings   Tick borne illness testing pending   At this time will have him push fluids, take tyl or ibu prn   If any new symptoms develop, he knows he must go to the ER       HARISH Sanderson, CNP  [facility]

## 2018-08-17 LAB — B BURGDOR IGG+IGM SER QL: 0.07 (ref 0–0.89)

## 2018-08-19 LAB
A PHAGOCYTOPH IGG TITR SER IF: NORMAL {TITER}
A PHAGOCYTOPH IGM TITR SER IF: NORMAL {TITER}

## 2018-08-20 ENCOUNTER — OFFICE VISIT (OUTPATIENT)
Dept: FAMILY MEDICINE | Facility: CLINIC | Age: 33
End: 2018-08-20
Payer: COMMERCIAL

## 2018-08-20 VITALS
BODY MASS INDEX: 24.69 KG/M2 | SYSTOLIC BLOOD PRESSURE: 114 MMHG | HEART RATE: 75 BPM | WEIGHT: 177 LBS | TEMPERATURE: 97.9 F | DIASTOLIC BLOOD PRESSURE: 82 MMHG | OXYGEN SATURATION: 97 %

## 2018-08-20 DIAGNOSIS — B34.9 VIRAL SYNDROME: Primary | ICD-10-CM

## 2018-08-20 LAB
DEPRECATED S PYO AG THROAT QL EIA: NORMAL
HETEROPH AB SER QL: NEGATIVE
SPECIMEN SOURCE: NORMAL

## 2018-08-20 PROCEDURE — 36415 COLL VENOUS BLD VENIPUNCTURE: CPT | Performed by: NURSE PRACTITIONER

## 2018-08-20 PROCEDURE — 86308 HETEROPHILE ANTIBODY SCREEN: CPT | Performed by: NURSE PRACTITIONER

## 2018-08-20 PROCEDURE — 99213 OFFICE O/P EST LOW 20 MIN: CPT | Performed by: NURSE PRACTITIONER

## 2018-08-20 PROCEDURE — 86665 EPSTEIN-BARR CAPSID VCA: CPT | Performed by: NURSE PRACTITIONER

## 2018-08-20 PROCEDURE — 87081 CULTURE SCREEN ONLY: CPT | Performed by: NURSE PRACTITIONER

## 2018-08-20 PROCEDURE — 87880 STREP A ASSAY W/OPTIC: CPT | Performed by: NURSE PRACTITIONER

## 2018-08-20 NOTE — LETTER
[place] [address] [facility]  [address]  [place]  [address]  Tel: [phone]    August 23, 2018    Romario MILE Horta  [address] [facility] [address]        Dear Kahlil Mychal,    Enclosed are your results.     If you have any further questions or problems, please contact our office.      Sincerely,    Joana Tenorio NP/ Deja Nunez, CMA  Results for orders placed or performed in visit on 08/20/18   Mononucleosis screen   Result Value Ref Range    Mononucleosis Screen Negative NEG^Negative   EBV Capsid Antibody IgM   Result Value Ref Range    EBV Capsid Antibody IgM <0.2 0.0 - 0.8 AI   Strep, Rapid Screen   Result Value Ref Range    Specimen Description Throat     Rapid Strep A Screen       NEGATIVE: No Group A streptococcal antigen detected by immunoassay, await culture report.   Beta strep group A culture   Result Value Ref Range    Specimen Description Throat     Culture Micro No beta hemolytic Streptococcus Group A isolated                Enclosure: Lab Results

## 2018-08-20 NOTE — MR AVS SNAPSHOT
After Visit Summary   8/20/2018    Romario Horta    MRN: [id number]           Patient Information     Date Of Birth          1985        Visit Information        Provider Department      8/20/2018 10:00 AM Joana Tenorio APRN CNP [facility]        Today's Diagnoses     Viral syndrome    -  1       Follow-ups after your visit        Follow-up notes from your care team     Return if symptoms worsen or fail to improve.      Who to contact     If you have questions or need follow up information about today's clinic visit or your schedule please contact [facility] directly at [phone].  Normal or non-critical lab and imaging results will be communicated to you by MyChart, letter or phone within 4 business days after the clinic has received the results. If you do not hear from us within 7 days, please contact the clinic through Estechhart or phone. If you have a critical or abnormal lab result, we will notify you by phone as soon as possible.  Submit refill requests through NaviExpert or call your pharmacy and they will forward the refill request to us. Please allow 3 business days for your refill to be completed.          Additional Information About Your Visit        MyChart Information     NaviExpert gives you secure access to your electronic health record. If you see a primary care provider, you can also send messages to your care team and make appointments. If you have questions, please call your primary care clinic.  If you do not have a primary care provider, please call [phone] and they will assist you.        Care EveryWhere ID     This is your Care EveryWhere ID. This could be used by other organizations to access your [place] medical records  [id number]        Your Vitals Were     Pulse Temperature Pulse Oximetry BMI (Body Mass Index)          75 97.9  F (36.6  C) (Oral) 97% 24.69 kg/m2         Blood Pressure from Last 3 Encounters:   08/20/18 114/82   08/16/18  127/77   08/15/18 (!) 131/94    Weight from Last 3 Encounters:   08/20/18 177 lb (80.3 kg)   08/16/18 182 lb (82.6 kg)   08/14/18 180 lb (81.6 kg)              We Performed the Following     Beta strep group A culture     EBV Capsid Antibody IgM     Mononucleosis screen     Strep, Rapid Screen        Primary Care Provider Office Phone # Fax #    Ania Cortez,  [phone] [address]  [facility] [address]        Equal Access to Services     [facility]: Hadii aad ku hadasho Soomaali, waaxda luqadaha, qaybta kaalmada adeegyada, waxvinay colin . So [facility] [phone].    ATENCIÓN: Si habla español, tiene a bustos disposición servicios gratuitos de asistencia lingüística. [facility] [phone].    We comply with applicable federal civil rights laws and [place] laws. We do not discriminate on the basis of race, color, national origin, age, disability, sex, sexual orientation, or gender identity.            Thank you!     Thank you for choosing [facility]  for your care. Our goal is always to provide you with excellent care. Hearing back from our patients is one way we can continue to improve our services. Please take a few minutes to complete the written survey that you may receive in the mail after your visit with us. Thank you!             Your Updated Medication List - Protect others around you: Learn how to safely use, store and throw away your medicines at www.disposemymeds.org.          This list is accurate as of 8/20/18 11:59 PM.  Always use your most recent med list.                   Brand Name Dispense Instructions for use Diagnosis    ALPRAZolam 0.25 MG tablet    XANAX     Take 0.25 mg by mouth daily as needed        ISOtretinoin 40 MG capsule    ACCUTANE     Take 120 mg by mouth daily        XYZAL ALLERGY 24HR PO      Take 1 capsule by mouth daily as needed

## 2018-08-20 NOTE — PROGRESS NOTES
SUBJECTIVE:   Romario Horta is a 33 year old male who presents to clinic today for the following health issues:      RESPIRATORY SYMPTOMS      Duration: 6 days duration of fever, sore throat, ear pain and fatigue, He was seen in ED and subsequently here in clinic and dx was viral illness.  All symptoms are improving and he no longer has chest pain,  but he has continued to consult multiple medical acquaintances and is here today specifically to be tested for mono.  Also reports that he was not tested for strep and would like to be.     Description  Fever subsided and ear pain bilateral    Severity: moderate, feels he has been improving gradually, afebrile now, ears not as bad    But throat still very sore     Accompanying signs and symptoms: None    History (predisposing factors):  none    Precipitating or alleviating factors: None    Therapies tried and outcome:  None    Has been speaking to various physician relatives and primary providers and they have suggested mono as a dx; he was dx with mono 6 yrs ago    Requesting Mono test      Problem list and histories reviewed & adjusted, as indicated.  Additional history: as documented    Patient Active Problem List   Diagnosis     Cerumen impaction     No past surgical history on file.    Social History   Substance Use Topics     Smoking status: Never Smoker     Smokeless tobacco: Never Used     Alcohol use Yes      Comment: abt 2 drinks per week      No family history on file.      Current Outpatient Prescriptions   Medication Sig Dispense Refill     ALPRAZolam (XANAX) 0.25 MG tablet Take 0.25 mg by mouth daily as needed       ISOtretinoin (ACCUTANE) 40 MG capsule Take 120 mg by mouth daily       Levocetirizine Dihydrochloride (XYZAL ALLERGY 24HR PO) Take 1 capsule by mouth daily as needed       No Known Allergies    Reviewed and updated as needed this visit by clinical staff  Allergies       Reviewed and updated as needed this visit by Provider          ROS:  Constitutional, HEENT, cardiovascular, pulmonary, gi and gu systems are negative, except as otherwise noted.    OBJECTIVE:     /82 (BP Location: Left arm, Patient Position: Sitting, Cuff Size: Adult Regular)  Pulse 75  Temp 97.9  F (36.6  C) (Oral)  Wt 177 lb (80.3 kg)  SpO2 97%  BMI 24.69 kg/m2  Body mass index is 24.69 kg/(m^2).    GENERAL:alert and moderate distress, afebrile, 5# weight loss  EYES: Eyes grossly normal to inspection, PERRL and conjunctivae and sclerae normal  HENT: ear canals and TM's normal, nose and mouth without ulcers or lesions, lips appear dry, tonsills enlarged but not beefy and no exudate  NECK: no adenopathy, no asymmetry, masses, or scars and thyroid normal to palpation  RESP: lungs clear to auscultation - no rales, rhonchi or wheezes  CV: regular rate and rhythm, normal S1 S2, no S3 or S4, no murmur, click or rub, no peripheral edema   ABDOMEN: soft, nontender, no hepatosplenomegaly, no masses and bowel sounds normal  MS: no gross musculoskeletal defects noted, no edema    Diagnostic Test Results:  Results for orders placed or performed in visit on 08/20/18   Mononucleosis screen   Result Value Ref Range    Mononucleosis Screen Negative NEG^Negative   EBV Capsid Antibody IgM   Result Value Ref Range    EBV Capsid Antibody IgM <0.2 0.0 - 0.8 AI   Strep, Rapid Screen   Result Value Ref Range    Specimen Description Throat     Rapid Strep A Screen       NEGATIVE: No Group A streptococcal antigen detected by immunoassay, await culture report.   Beta strep group A culture   Result Value Ref Range    Specimen Description Throat     Culture Micro No beta hemolytic Streptococcus Group A isolated      ASSESSMENT/PLAN:         ICD-10-CM    1. Viral syndrome B34.9 Mononucleosis screen     EBV Capsid Antibody IgM     Strep, Rapid Screen     Beta strep group A culture       [facility]AR[facility]

## 2018-08-21 ENCOUNTER — TELEPHONE (OUTPATIENT)
Dept: FAMILY MEDICINE | Facility: CLINIC | Age: 33
End: 2018-08-21

## 2018-08-21 LAB
BACTERIA SPEC CULT: NORMAL
EBV VCA IGM SER QL IA: <0.2 AI (ref 0–0.8)
SPECIMEN SOURCE: NORMAL

## 2018-08-21 NOTE — TELEPHONE ENCOUNTER
Reason for Call:  Request for results:    Name of test or procedure: 8/20    Date of test of procedure: Labs    Location of the test or procedure: [facility]    OK to leave the result message on voice mail or with a family member? YES    Phone number Patient can be reached at:  Home number on file [phone] (home)    Additional comments: was told he would hear from us yesterday about his results    Call taken on 8/21/2018 at 4:45 PM by Ruslan Nagel

## 2018-08-22 LAB
BACTERIA SPEC CULT: NO GROWTH
BACTERIA SPEC CULT: NO GROWTH
Lab: NORMAL
Lab: NORMAL
SPECIMEN SOURCE: NORMAL
SPECIMEN SOURCE: NORMAL

## 2018-08-23 NOTE — PROGRESS NOTES
Romario  All of your labs have returned negative. I hope you are feeling better. I see you saw my colleague for these symptoms as well. Please follow up if you are still feeling ill   Rosemary

## 2018-09-06 NOTE — NURSING NOTE
Screening Questionnaire for Adult Immunization     Are you sick today?   No    Do you have allergies to medications, food or any vaccine?   No    Have you ever had a serious reaction after receiving a vaccination?   No    Do you have a long-term health problem with heart disease, lung disease,  asthma, kidney disease, diabetes, anemia, metabolic or blood disease?   No    Do you have cancer, leukemia, AIDS, or any immune system problem?   No    Do you take cortisone, prednisone, other steroids, or anticancer drugs, or  have you had any x-ray (radiation) treatments?   No    Have you had a seizure, brain, or other nervous system problem?   No    During the past year, have you received a transfusion of blood or blood       products, or been given a medicine called immune (gamma) globulin?   No    For women: Are you pregnant or is there a chance you could become         pregnant during the next month?   No    Have you received any vaccinations in the past 4 weeks?   No     Immunization questionnaire answers were all negative.      MNVFC doesn't apply on this patient  Prior to injection verified patient identity using patient's name and date of birth.  Due to injection administration, patient instructed to remain in clinic for 15 minutes  afterwards, and to report any adverse reaction to me immediately.           Screening performed by Pam Young on 6/5/2018 at 9:33 AM.       Vaccine Information Statement(s) was given today. This has been reviewed, questions answered, and verbal consent given by Patient for injection(s) and administration of Tetanus/Diphtheria/Pertussis (Tdap).    Patient tolerated without incident. See immunization grid for documentation.

## 2020-01-27 ENCOUNTER — OFFICE VISIT (OUTPATIENT)
Dept: FAMILY MEDICINE | Facility: CLINIC | Age: 35
End: 2020-01-27
Payer: COMMERCIAL

## 2020-01-27 ENCOUNTER — HOSPITAL ENCOUNTER (OUTPATIENT)
Dept: CT IMAGING | Facility: CLINIC | Age: 35
Discharge: HOME | End: 2020-01-27
Attending: INTERNAL MEDICINE | Admitting: INTERNAL MEDICINE
Payer: COMMERCIAL

## 2020-01-27 VITALS
TEMPERATURE: 96.5 F | BODY MASS INDEX: 26.6 KG/M2 | HEIGHT: 71 IN | DIASTOLIC BLOOD PRESSURE: 75 MMHG | HEART RATE: 78 BPM | OXYGEN SATURATION: 100 % | SYSTOLIC BLOOD PRESSURE: 110 MMHG | WEIGHT: 190 LBS

## 2020-01-27 DIAGNOSIS — R10.9 FLANK PAIN: ICD-10-CM

## 2020-01-27 DIAGNOSIS — R10.32 LLQ ABDOMINAL PAIN: ICD-10-CM

## 2020-01-27 DIAGNOSIS — Z84.1 FAMILY HISTORY OF KIDNEY STONE: ICD-10-CM

## 2020-01-27 DIAGNOSIS — K52.9 COLITIS: Primary | ICD-10-CM

## 2020-01-27 PROCEDURE — 99214 OFFICE O/P EST MOD 30 MIN: CPT | Performed by: INTERNAL MEDICINE

## 2020-01-27 PROCEDURE — 74176 CT ABD & PELVIS W/O CONTRAST: CPT

## 2020-01-27 ASSESSMENT — MIFFLIN-ST. JEOR: SCORE: 1818.96

## 2020-01-27 NOTE — PROGRESS NOTES
Chief Complaint:     LLQ Abdominal Pain    HPI:   Patient Romario Horta is a very pleasant 34 year old male with a positive family history of kidney stones who presents to Internal Medicine clinic today for evaluation of LLQ abdominal and flank pains.       LLQ Abdominal Pain    Duration:     Since: recent for 3 weeks           Specific cause: none    Description:      Location of pain: left lower abdominal, left flank pains     Character of pain: dull     Pain radiation: left flank    Intensity: moderate    History:      Pain interferes with job: yes     History of similar pain problems: no     Any previous MRI or X-rays: none     Therapies tried without relief: none    Alleviating factors:      Improved by: none    Precipitating factors:    Worsened by: none    Accompanying Signs & Symptoms: left flank pains            Current Medications:     No current outpatient medications on file.         Allergies:    No Known Allergies         Past Medical History:     Past Medical History:   Diagnosis Date     Cerumen impaction          Past Surgical History:   No past surgical history on file.      Family Medical History:     Family History   Problem Relation Age of Onset     Nephrolithiasis Mother      Nephrolithiasis Sister          Social History:     Social History     Socioeconomic History     Marital status:      Spouse name: Not on file     Number of children: Not on file     Years of education: Not on file     Highest education level: Not on file   Occupational History     Not on file   Social Needs     Financial resource strain: Not on file     Food insecurity:     Worry: Not on file     Inability: Not on file     Transportation needs:     Medical: Not on file     Non-medical: Not on file   Tobacco Use     Smoking status: Never Smoker     Smokeless tobacco: Never Used   Substance and Sexual Activity     Alcohol use: Yes     Comment: abt 2 drinks per week      Drug use: No     Sexual activity: Yes      "Partners: Female   Lifestyle     Physical activity:     Days per week: Not on file     Minutes per session: Not on file     Stress: Not on file   Relationships     Social connections:     Talks on phone: Not on file     Gets together: Not on file     Attends Jew service: Not on file     Active member of club or organization: Not on file     Attends meetings of clubs or organizations: Not on file     Relationship status: Not on file     Intimate partner violence:     Fear of current or ex partner: Not on file     Emotionally abused: Not on file     Physically abused: Not on file     Forced sexual activity: Not on file   Other Topics Concern     Not on file   Social History Narrative     Not on file           Review of System:     Constitutional: Negative for fever or chills  Skin: Negative for rashes  Ears/Nose/Throat: Negative for nasal congestion, sore throat  Respiratory: No shortness of breath, dyspnea on exertion, cough, or hemoptysis  Cardiovascular: Negative for chest pain  Gastrointestinal: Negative for nausea, vomiting. Positive for left flank, left lower abdominal pains for 3 weeks  Genitourinary: Negative for dysuria, hematuria  Musculoskeletal: Negative for myalgias  Neurologic: Negative for headaches  Psychiatric: Negative for depression, anxiety  Hematologic/Lymphatic/Immunologic: Negative  Endocrine: Negative  Behavioral: Negative for tobacco use       Physical Exam:   /75 (BP Location: Left arm, Cuff Size: Adult Large)   Pulse 78   Temp 96.5  F (35.8  C) (Oral)   Ht 1.803 m (5' 11\")   Wt 86.2 kg (190 lb)   SpO2 100%   BMI 26.50 kg/m      GENERAL: alert and no distress, no fever  EYES: eyes grossly normal to inspection, and conjunctivae and sclerae normal  HENT: Normocephalic atraumatic. Nose and mouth without ulcers or lesions  NECK: supple  RESP: lungs clear to auscultation   CV: regular rate and rhythm, normal S1 S2  LYMPH: no peripheral edema   ABDOMEN: soft, non-distended, mild " left lateral lower abdominal pains noted, no acute rebound or guarding.  MS: no gross musculoskeletal defects noted  SKIN: no suspicious lesions or rashes  NEURO: Alert & Oriented x 3.   PSYCH: mentation appears normal, affect normal        Diagnostic Test Results:     CT ABDOMEN AND PELVIS WITHOUT CONTRAST 1/27/2020 3:05 PM      HISTORY: Flank pain, stone disease suspected - left; left flank pain;  Flank pain; Family history of kidney stone     COMPARISON: None.     TECHNIQUE: Volumetric helical acquisition of CT images from the lung  bases through the symphysis pubis without intravenous contrast.  Radiation dose for this scan was reduced using automated exposure  control, adjustment of the mA and/or kV according to patient size, or  iterative reconstruction technique.     FINDINGS:   LUNG BASES: Normal.     ABDOMEN: The kidneys are normal in size without perinephric straining.  There is no hydronephrosis or hydroureter. There are no renal or  ureteral calculi.     Normal spleen, pancreas, adrenal glands the liver, and gallbladder.  Normal stomach. Normal caliber of the small bowel. There are a few  prominent mesenteric lymph nodes. A right lower quadrant mesenteric  lymph node is 8 mm. Incidentally noted is a retroaortic left renal  vein.     PELVIS: Nondistended urinary bladder. The prostate gland is normal in  size. The seminal vesicles are symmetric. There is wall thickening of  the transverse and descending colon. Normal appendix.     MUSCULOSKELETAL: No aggressive lesions.                                                                      IMPRESSION:   1.  Normal kidneys and ureters. No renal or ureteral calculi. No  hydronephrosis or hydroureter.  2.  Wall thickening of the transverse and descending colon can be seen  with an infectious or inflammatory colitis. A few prominent mesenteric  lymph nodes are likely related.     LESTER J FAHRNER, MD    ASSESSMENT/PLAN:     (R10.32) LLQ abdominal pain  (K52.9)  Colitis  (primary encounter diagnosis)  (R10.9) Flank pain  (Z84.1) Family history of kidney stone  Comment: 3 weeks of LLQ abdominal pains, left flank pains concerning for possible colitis vs. Kidney stones. Patient has a strong family history of kidney stones.  Plan: CT Abdomen Pelvis w/o & w Contrast is negative for kidney stones. However, the CT scan does reveal colitis symptoms. I have ordered a  GASTROENTEROLOGY ADULT REF CONSULT ONLY where his appointment has been scheduled for tomorrow morning at [facility], [place]: [phone].      Follow Up Plan:     Patient is instructed to return to Internal Medicine clinic for follow-up visit in 1 week.        Kathia Galo MD  Internal Medicine  [facility]

## 2020-01-28 ENCOUNTER — TRANSFERRED RECORDS (OUTPATIENT)
Dept: HEALTH INFORMATION MANAGEMENT | Facility: CLINIC | Age: 35
End: 2020-01-28

## 2020-01-28 LAB
ALT SERPL-CCNC: 12 IU/L (ref 0–44)
AST SERPL-CCNC: 21 IU/L (ref 0–40)
CREAT SERPL-MCNC: 1.26 MG/DL (ref 0.76–1.27)
GFR SERPL CREATININE-BSD FRML MDRD: 73 ML/MIN/1.73
GLUCOSE SERPL-MCNC: 102 MG/DL (ref 65–99)
POTASSIUM SERPL-SCNC: 4.3 MMOL/L (ref 3.5–5.2)

## 2020-01-31 ENCOUNTER — TRANSFERRED RECORDS (OUTPATIENT)
Dept: HEALTH INFORMATION MANAGEMENT | Facility: CLINIC | Age: 35
End: 2020-01-31

## 2020-02-04 ENCOUNTER — TRANSFERRED RECORDS (OUTPATIENT)
Dept: HEALTH INFORMATION MANAGEMENT | Facility: CLINIC | Age: 35
End: 2020-02-04

## 2020-02-16 ENCOUNTER — HEALTH MAINTENANCE LETTER (OUTPATIENT)
Age: 35
End: 2020-02-16

## 2020-08-14 ENCOUNTER — NURSE TRIAGE (OUTPATIENT)
Dept: NURSING | Facility: CLINIC | Age: 35
End: 2020-08-14

## 2020-08-14 ENCOUNTER — APPOINTMENT (OUTPATIENT)
Dept: CT IMAGING | Facility: CLINIC | Age: 35
End: 2020-08-14
Attending: EMERGENCY MEDICINE
Payer: COMMERCIAL

## 2020-08-14 ENCOUNTER — HOSPITAL ENCOUNTER (EMERGENCY)
Facility: CLINIC | Age: 35
Discharge: HOME | End: 2020-08-14
Attending: EMERGENCY MEDICINE | Admitting: EMERGENCY MEDICINE
Payer: COMMERCIAL

## 2020-08-14 VITALS
WEIGHT: 185 LBS | HEART RATE: 64 BPM | OXYGEN SATURATION: 98 % | RESPIRATION RATE: 16 BRPM | HEIGHT: 71 IN | SYSTOLIC BLOOD PRESSURE: 128 MMHG | DIASTOLIC BLOOD PRESSURE: 89 MMHG | TEMPERATURE: 98.2 F | BODY MASS INDEX: 25.9 KG/M2

## 2020-08-14 DIAGNOSIS — N36.8 URETHRAL MEATUS PAIN: ICD-10-CM

## 2020-08-14 DIAGNOSIS — R31.9 HEMATURIA, UNSPECIFIED TYPE: ICD-10-CM

## 2020-08-14 DIAGNOSIS — R10.2 SUPRAPUBIC PAIN, ACUTE: ICD-10-CM

## 2020-08-14 LAB
ALBUMIN UR-MCNC: 10 MG/DL
APPEARANCE UR: CLEAR
BILIRUB UR QL STRIP: NEGATIVE
COLOR UR AUTO: YELLOW
GLUCOSE UR STRIP-MCNC: NEGATIVE MG/DL
HGB UR QL STRIP: ABNORMAL
KETONES UR STRIP-MCNC: NEGATIVE MG/DL
LEUKOCYTE ESTERASE UR QL STRIP: NEGATIVE
MUCOUS THREADS #/AREA URNS LPF: PRESENT /LPF
NITRATE UR QL: NEGATIVE
PH UR STRIP: 6.5 PH (ref 5–7)
RBC #/AREA URNS AUTO: >182 /HPF (ref 0–2)
SOURCE: ABNORMAL
SP GR UR STRIP: 1.03 (ref 1–1.03)
UROBILINOGEN UR STRIP-MCNC: NORMAL MG/DL (ref 0–2)
WBC #/AREA URNS AUTO: 1 /HPF (ref 0–5)

## 2020-08-14 PROCEDURE — 87086 URINE CULTURE/COLONY COUNT: CPT | Performed by: EMERGENCY MEDICINE

## 2020-08-14 PROCEDURE — 81001 URINALYSIS AUTO W/SCOPE: CPT | Performed by: EMERGENCY MEDICINE

## 2020-08-14 PROCEDURE — 99284 EMERGENCY DEPT VISIT MOD MDM: CPT | Mod: 25

## 2020-08-14 PROCEDURE — 74176 CT ABD & PELVIS W/O CONTRAST: CPT

## 2020-08-14 ASSESSMENT — ENCOUNTER SYMPTOMS
FREQUENCY: 1
ABDOMINAL PAIN: 1

## 2020-08-14 ASSESSMENT — MIFFLIN-ST. JEOR: SCORE: 1791.28

## 2020-08-14 NOTE — ED TRIAGE NOTES
Pt went to urgent care today and was sent here - his pain started at the tip of his penis then traveled up to his lower abd - stated its a lot of pain.

## 2020-08-14 NOTE — TELEPHONE ENCOUNTER
Patient states has severe pain in tip of penis and Mid low abdomen.  Pain started within last 1 hour. Voided 1 hour ago. Normal void by report. No blood.   Denies discharge.  Currently describes pain as 8/10 and constant. Now has cramping in Mid low abdomen 8-9/10.  Afebrile by report.    Protocol-  Penis & Scrotum symptoms  Care advice reviewed.   Disposition-  Per COVID19  Advised Urgent Care  Caller states understanding of the recommended disposition.   Advised to call back if further questions or concerns.     ABDI Hammond RN  [facility]     COVID 19 Nurse Triage Plan/Patient Instructions    Please be aware that novel coronavirus (COVID-19) may be circulating in the community. If you develop symptoms such as fever, cough, or SOB or if you have concerns about the presence of another infection including coronavirus (COVID-19), please contact your health care provider or visit [email or web address].     Disposition/Instructions    In-Person Visit with provider recommended. Reference Visit Selection Guide.    Thank you for taking steps to prevent the spread of this virus.  o Limit your contact with others.  o Wear a simple mask to cover your cough.  o Wash your hands well and often.    Resources    [facility]: About COVID-19: [email or web address]    CDC: What to Do If You're Sick: www.cdc.gov/coronavirus/2019-ncov/about/steps-when-sick.html    CDC: Ending Home Isolation: www.cdc.gov/coronavirus/2019-ncov/hcp/disposition-in-home-patients.html     CDC: Caring for Someone: www.cdc.gov/coronavirus/2019-ncov/if-you-are-sick/care-for-someone.html     [facility]: Interim Guidance for Hospital Discharge to Home: www.health.[facility].mn.us/diseases/coronavirus/hcp/hospdischarge.pdf    [facility] clinical trials (COVID-19 research studies): [email or web address]-trials     Below are the COVID-19 hotlines at the [place] Department [facility] ([facility]). Interpreters are available.    o For health questions: Call 009-406-1286 or 1-133.435.8086 (7 a.m. to 7 p.m.)  o For questions about schools and childcare: Call 698-132-6686 or 1-409.483.1133 (7 a.m. to 7 p.m.)                       Additional Information    Negative: [1] Blood from end of penis AND [2] large amount    Negative: [1] Not circumcised AND [2] foreskin pulled back and stuck    Negative: [1] Looks infected (e.g., draining sore, ulcer, rash is painful to touch) AND [2] fever > 100.5 F (38.1 C)    Negative: [1] Unable to urinate (or only a few drops) > 4 hours AND     [2] bladder feels very full (e.g., palpable bladder or strong urge to urinate)    Negative: [1] Erection AND [2] present > 4 hours    Negative: Patient sounds very sick or weak to the triager    Negative: [1] Pus or bloody discharge from the end of the penis AND [2] fever    Negative: [1] Pain or burning with passing urine AND [2] fever > 100.5 F (38.1 C)    [1] Pain or burning with passing urine AND [2] side (flank) or back pain present    Protocols used: PENIS AND SCROTUM SYMPTOMS-A-AH

## 2020-08-14 NOTE — ED AVS SNAPSHOT
Emergency Department  [address] [place] [address]  Phone:  [phone]  Fax:  [phone]                                    Romario Horta   MRN: [id number]    Department:   Emergency Department   Date of Visit:  8/14/2020           After Visit Summary Signature Page    I have received my discharge instructions, and my questions have been answered. I have discussed any challenges I see with this plan with the nurse or doctor.    ..........................................................................................................................................  Patient/Patient Representative Signature      ..........................................................................................................................................  Patient Representative Print Name and Relationship to Patient    ..................................................               ................................................  Date                                   Time    ..........................................................................................................................................  Reviewed by Signature/Title    ...................................................              ..............................................  Date                                               Time          22EPIC Rev 08/18

## 2020-08-15 NOTE — ED NOTES
Bed: ED22  Expected date:   Expected time:   Means of arrival:   Comments:  Held; contact clean, EVS aware 7:30 PM

## 2020-08-16 LAB
BACTERIA SPEC CULT: NO GROWTH
Lab: NORMAL
SPECIMEN SOURCE: NORMAL

## 2020-08-16 NOTE — RESULT ENCOUNTER NOTE
Final urine culture report is NEGATIVE per [facility] ED Lab Result protocol.    If NEGATIVE result, no change in treatment, per [facility] ED Lab Result protocol.

## 2020-11-22 ENCOUNTER — HEALTH MAINTENANCE LETTER (OUTPATIENT)
Age: 35
End: 2020-11-22

## 2021-04-04 ENCOUNTER — HEALTH MAINTENANCE LETTER (OUTPATIENT)
Age: 36
End: 2021-04-04

## 2021-09-18 ENCOUNTER — HEALTH MAINTENANCE LETTER (OUTPATIENT)
Age: 36
End: 2021-09-18

## 2022-04-30 ENCOUNTER — HEALTH MAINTENANCE LETTER (OUTPATIENT)
Age: 37
End: 2022-04-30

## 2022-11-20 ENCOUNTER — HEALTH MAINTENANCE LETTER (OUTPATIENT)
Age: 37
End: 2022-11-20

## 2023-06-02 ENCOUNTER — HEALTH MAINTENANCE LETTER (OUTPATIENT)
Age: 38
End: 2023-06-02

## 2023-08-01 ENCOUNTER — APPOINTMENT (OUTPATIENT)
Dept: CT IMAGING | Facility: CLINIC | Age: 38
End: 2023-08-01
Attending: PHYSICIAN ASSISTANT
Payer: COMMERCIAL

## 2023-08-01 ENCOUNTER — HOSPITAL ENCOUNTER (EMERGENCY)
Facility: CLINIC | Age: 38
Discharge: HOME | End: 2023-08-01
Attending: PHYSICIAN ASSISTANT | Admitting: PHYSICIAN ASSISTANT
Payer: COMMERCIAL

## 2023-08-01 VITALS
RESPIRATION RATE: 16 BRPM | TEMPERATURE: 98 F | HEART RATE: 67 BPM | HEIGHT: 71 IN | WEIGHT: 185 LBS | BODY MASS INDEX: 25.9 KG/M2 | DIASTOLIC BLOOD PRESSURE: 77 MMHG | SYSTOLIC BLOOD PRESSURE: 110 MMHG | OXYGEN SATURATION: 97 %

## 2023-08-01 DIAGNOSIS — R30.0 DYSURIA: ICD-10-CM

## 2023-08-01 LAB
ALBUMIN UR-MCNC: ABNORMAL G/DL
ANION GAP SERPL CALCULATED.3IONS-SCNC: 14 MMOL/L (ref 7–15)
APPEARANCE UR: CLEAR
BASOPHILS # BLD AUTO: 0 10E3/UL (ref 0–0.2)
BASOPHILS NFR BLD AUTO: 1 %
BILIRUB UR QL STRIP: ABNORMAL
BUN SERPL-MCNC: 13.9 MG/DL (ref 6–20)
CALCIUM SERPL-MCNC: 9.1 MG/DL (ref 8.6–10)
CHLORIDE SERPL-SCNC: 106 MMOL/L (ref 98–107)
COLOR UR AUTO: ABNORMAL
CREAT SERPL-MCNC: 1.05 MG/DL (ref 0.67–1.17)
DEPRECATED HCO3 PLAS-SCNC: 22 MMOL/L (ref 22–29)
EOSINOPHIL # BLD AUTO: 0.1 10E3/UL (ref 0–0.7)
EOSINOPHIL NFR BLD AUTO: 1 %
ERYTHROCYTE [DISTWIDTH] IN BLOOD BY AUTOMATED COUNT: 12.3 % (ref 10–15)
GFR SERPL CREATININE-BSD FRML MDRD: >90 ML/MIN/1.73M2
GLUCOSE SERPL-MCNC: 94 MG/DL (ref 70–99)
GLUCOSE UR STRIP-MCNC: ABNORMAL MG/DL
HCT VFR BLD AUTO: 45.5 % (ref 40–53)
HGB BLD-MCNC: 15.3 G/DL (ref 13.3–17.7)
HGB UR QL STRIP: ABNORMAL
IMM GRANULOCYTES # BLD: 0 10E3/UL
IMM GRANULOCYTES NFR BLD: 0 %
KETONES UR STRIP-MCNC: ABNORMAL MG/DL
LEUKOCYTE ESTERASE UR QL STRIP: ABNORMAL
LYMPHOCYTES # BLD AUTO: 1.5 10E3/UL (ref 0.8–5.3)
LYMPHOCYTES NFR BLD AUTO: 23 %
MCH RBC QN AUTO: 28.8 PG (ref 26.5–33)
MCHC RBC AUTO-ENTMCNC: 33.6 G/DL (ref 31.5–36.5)
MCV RBC AUTO: 86 FL (ref 78–100)
MONOCYTES # BLD AUTO: 0.6 10E3/UL (ref 0–1.3)
MONOCYTES NFR BLD AUTO: 8 %
NEUTROPHILS # BLD AUTO: 4.5 10E3/UL (ref 1.6–8.3)
NEUTROPHILS NFR BLD AUTO: 67 %
NITRATE UR QL: ABNORMAL
NRBC # BLD AUTO: 0 10E3/UL
NRBC BLD AUTO-RTO: 0 /100
PH UR STRIP: ABNORMAL [PH]
PLATELET # BLD AUTO: 253 10E3/UL (ref 150–450)
POTASSIUM SERPL-SCNC: 3.9 MMOL/L (ref 3.4–5.3)
RBC # BLD AUTO: 5.31 10E6/UL (ref 4.4–5.9)
RBC URINE: 1 /HPF
SODIUM SERPL-SCNC: 142 MMOL/L (ref 136–145)
SP GR UR STRIP: ABNORMAL
UROBILINOGEN UR STRIP-MCNC: ABNORMAL MG/DL
WBC # BLD AUTO: 6.7 10E3/UL (ref 4–11)
WBC URINE: 3 /HPF

## 2023-08-01 PROCEDURE — 36415 COLL VENOUS BLD VENIPUNCTURE: CPT | Performed by: EMERGENCY MEDICINE

## 2023-08-01 PROCEDURE — 99284 EMERGENCY DEPT VISIT MOD MDM: CPT | Mod: 25

## 2023-08-01 PROCEDURE — 80048 BASIC METABOLIC PNL TOTAL CA: CPT | Performed by: EMERGENCY MEDICINE

## 2023-08-01 PROCEDURE — 81001 URINALYSIS AUTO W/SCOPE: CPT | Performed by: EMERGENCY MEDICINE

## 2023-08-01 PROCEDURE — 85014 HEMATOCRIT: CPT | Performed by: EMERGENCY MEDICINE

## 2023-08-01 PROCEDURE — 74176 CT ABD & PELVIS W/O CONTRAST: CPT

## 2023-08-01 ASSESSMENT — ACTIVITIES OF DAILY LIVING (ADL): ADLS_ACUITY_SCORE: 35

## 2023-08-01 NOTE — ED TRIAGE NOTES
Pt has burning urination for the past couple of days   Pt was seen at urgent care [facility]     Pt started to have blood in urine and was referred to ed by urgent

## 2023-08-01 NOTE — ED PROVIDER NOTES
"  History     Chief Complaint:  UTI       HPI   Romario Horta is a 38 year old male  who presents with dysuria and hematuria.  Patient states that he developed dysuria and frequency yesterday morning. He then developed penile discomfort over the past 24 hours and suspects he had a fever last night.  Patient states he was seen by urgent care this morning where UA was negative for infection.  He scheduled appointment with primary care for tomorrow however, at noon today, patient noticed he had red-colored urine.  No blood clots are noted.  Patient also admits to continuing penile discomfort and urinary retention.  He has tried AZO without improvement.  Patient states that he had similar symptoms 3 years ago and was evaluated for a kidney stone.  No symptoms since.  Denies chance for STD.  No fevers, vomiting, or diarrhea.  No recent pelvic or abdominal trauma.  No blood thinner use.    Independent Historian:   None - Patient Only    Review of External Notes:   08/14/2020    Medications:    No current outpatient medications on file.      Past Medical History:    Past Medical History:   Diagnosis Date    Cerumen impaction        Past Surgical History:    History reviewed. No pertinent surgical history.     Physical Exam   Patient Vitals for the past 24 hrs:   BP Temp Temp src Pulse Resp SpO2 Height Weight   08/01/23 1619 110/77 -- -- 67 -- -- -- --   08/01/23 1546 -- -- -- -- -- 97 % -- --   08/01/23 1543 -- -- -- -- -- 99 % -- --   08/01/23 1542 119/74 -- -- 66 -- -- -- --   08/01/23 1339 114/77 98  F (36.7  C) Oral 74 16 98 % -- --   08/01/23 1338 -- -- -- -- -- -- 1.803 m (5' 11\") 83.9 kg (185 lb)        Physical Exam  Constitutional: Alert, attentive, GCS 15  HENT:    Nose: Nose normal.    Mouth/Throat: Oropharynx is clear, mucous membranes are moist  Eyes:  EOM are normal.    CV:  regular rate and rhythm; no murmurs, rubs or gallups  Chest: Effort normal and breath sounds normal.   GI:   Epigastrium - no " tenderness, no guarding   RUQ - no tenderness or Curry's sign   RLQ - No tenderness, no guarding, no Rovsing's sign   Suprapubic area - no tenderness, no guarding    LLQ - no tenderness, no guarding   LUQ - no tenderness, no guarding   No distension. Normal bowel sounds  : No CVA tenderness. Chaperone Tech Zeynep present. Normal penis and testis. No blood at urethral meatus. No masses or tenderness of scrotum.  MSK: Normal range of motion.   Neurological: Alert, attentive  Skin: Skin is warm and dry.      Emergency Department Course     Imaging:  Abd/pelvis CT no contrast - Stone Protocol   Final Result   IMPRESSION:    No radiopaque urinary calculi are identified. No hydronephrosis.      LEELA MORALES MD            SYSTEM ID:  [id number]         Report per radiology    Laboratory:  Labs Ordered and Resulted from Time of ED Arrival to Time of ED Departure   ROUTINE UA WITH MICROSCOPIC REFLEX TO CULTURE - Abnormal       Result Value    Color Urine Orange (*)     Appearance Urine Clear      Glucose Urine        Bilirubin Urine        Ketones Urine        Specific Gravity Urine        Blood Urine        pH Urine        Protein Albumin Urine        Urobilinogen Urine        Nitrite Urine        Leukocyte Esterase Urine        RBC Urine 1      WBC Urine 3     BASIC METABOLIC PANEL - Normal    Sodium 142      Potassium 3.9      Chloride 106      Carbon Dioxide (CO2) 22      Anion Gap 14      Urea Nitrogen 13.9      Creatinine 1.05      Calcium 9.1      Glucose 94      GFR Estimate >90     CBC WITH PLATELETS AND DIFFERENTIAL    WBC Count 6.7      RBC Count 5.31      Hemoglobin 15.3      Hematocrit 45.5      MCV 86      MCH 28.8      MCHC 33.6      RDW 12.3      Platelet Count 253      % Neutrophils 67      % Lymphocytes 23      % Monocytes 8      % Eosinophils 1      % Basophils 1      % Immature Granulocytes 0      NRBCs per 100 WBC 0      Absolute Neutrophils 4.5      Absolute Lymphocytes 1.5      Absolute Monocytes  0.6      Absolute Eosinophils 0.1      Absolute Basophils 0.0      Absolute Immature Granulocytes 0.0      Absolute NRBCs 0.0        Emergency Department Course & Assessments:       Interventions:  Medications - No data to display     Assessments:  1405  I obtained patient history and performed physical examination as above.      Independent Interpretation (X-rays, CTs, rhythm strip):  None    Consultations/Discussion of Management or Tests:  None        Social Determinants of Health affecting care:   None    Disposition:  The patient was discharged to home.     Impression & Plan    CMS Diagnoses: None      Medical Decision Making:  Patient is a well-appearing 38-year-old male who presents with dysuria, urinary frequency, and hematuria.  He is afebrile, normotensive, not hypoxic.  Physical examination reveals benign abdominal exam and no CVA tenderness.   exam is also normal.  Differential includes UTI, pyelonephritis, nephrolithiasis.  Blood work is reassuring.  Hemoglobin stable.  No leukocytosis. UA is contaminated due to AZO use however there is only 1 RBC and few WBC. Hematuria and UTI unlikely. I suspect patient took AZO and was unaware of the urinary discoloration associated with its use.  Results reviewed with patient. He is concerned for possible kidney stone and requests CT. Abdominal CT obtained and shows no evidence of stones or obstruction. Imaging discussed with patient. No evidence of nephrolithiasis or pyelonephritis here today. Patient declined STD testing. He remains hemodynamically stable and may follow-up with his primary care provider at appointment tomorrow.  Contact information also provided to local urology group.  Supportive cares and return precautions to the ED were discussed, and patient was ready for discharge    Diagnosis:    ICD-10-CM    1. Dysuria  R30.0            Discharge Medications:  There are no discharge medications for this patient.         8/1/2023   Ania Rangel,  Ania Ferrera PA-C  08/01/23 0758

## 2023-08-01 NOTE — DISCHARGE INSTRUCTIONS
Your labs and imaging are reassuring today.  There does not appear to be significant amount of blood in your urine.  Continue to drink fluids and follow-up with your primary care provider at scheduled appointment tomorrow.  Contact information also provided for local urology group.  For any new or worsening symptoms including fevers, abdominal pain, or any other emergent concern, present back to ED.

## 2023-08-01 NOTE — ED NOTES
"Tele-PIT/Intake Evaluation      Video-Visit Details    Type of service:  Video Visit    Video Start Time (time video started): 1:40 PM  Video End Time (time video stopped): 1:44 PM   Originating Location (pt. Location): [facility]  Distant Location (provider location):  [facility]  Mode of Communication:  Video Conference via [employer]  Patient verbally consented to theAudience televisit.    History:  Difficulty urinating and dysuria since yesterday morning. Also with frequency. Woke up with fever and sweating last night.  At urgent care this morning and UA was negative for UTI. Made follow up appointment with PMD tomorrow.  Now with hematuria.  Feels retaining urine.  Not on anticoagulation.  Patient started pyridium for buring started today. No flank or abdominal pain.    Exam:  General:  Alert, interactive  Cardiovascular:  Well perfused  Lungs:  No respiratory distress, no accessory muscle use  Neuro:  Moving all 4 extremities  Skin:  Warm, dry  Psych:  Normal affect   Patient Vitals for the past 24 hrs:   BP Temp Temp src Pulse Resp SpO2 Height Weight   08/01/23 1339 114/77 98  F (36.7  C) Oral 74 16 98 % -- --   08/01/23 1338 -- -- -- -- -- -- 1.803 m (5' 11\") 83.9 kg (185 lb)       Appropriate interventions for symptom management were initiated if applicable.  Appropriate diagnostic tests were initiated if indicated.    Important information for subsequent clinician:  Freuqency, burning and hematuria since yesterday with negative UA at urgent care this morning.      I briefly evaluated the patient and developed an initial plan of care. I discussed this plan and explained that this brief interaction does not constitute a full evaluation. Patient/family understands that they should wait to be fully evaluated and discuss any test results with another clinician prior to leaving the hospital.       Ariela Whitfield MD  08/01/23 1344    "

## 2023-08-09 NOTE — ED PROVIDER NOTES
Maryam Francisco presents today for a reading of her Mantoux Tuberculin Skin Test.    Completed physical given to mother.     See procedure tab for result.     Signed: Mara Hamm RN     "  History     Chief Complaint:  Abdominal Pain      HPI   Romario Horta is a 35 year old male who presents for evaluation of abdominal pain. Patient reports a sudden onset of pain on the tip of his penis at 330pm today. He was not urinating at the time. An hour later he began having left sided abdominal pain that has since mostly resolved. He states since then he has had increased urinary frequency. He presented to Urgent Care who advised him to present to the ED. He has taken ibuprofen for his pain. He denies testicle pain.     Allergies:  No known drug allergies    Medications:    The patient is not currently taking any prescribed medications.    Past Medical History:    The patient does not have any past pertinent medical history.    Past Surgical History:    History reviewed. No pertinent surgical history.    Family History:    Nephrolithiasis - mother, sister    Social History:  Smoking status: never smoker  Alcohol use: yes  Drug use: no  The patient presents to the emergency department by himself.  PCP: Kathia Galo  Marital Status:       Review of Systems   Gastrointestinal: Positive for abdominal pain.   Genitourinary: Positive for frequency and penile pain. Negative for testicular pain.   All other systems reviewed and are negative.      Physical Exam     Physical Exam    Patient Vitals for the past 24 hrs:   BP Temp Temp src Pulse Resp SpO2 Height Weight   08/14/20 1847 121/84 98.2  F (36.8  C) Temporal 65 16 98 % 1.803 m (5' 11\") 83.9 kg (185 lb)       General: Alert and Interactive.   Head: No signs of trauma.   Mouth/Throat: Oropharynx is clear and moist.   Eyes: Conjunctivae are normal. Pupils are equal, round, and reactive to light.   Neck: Normal range of motion. No nuchal rigidity.   CV: Normal rate and regular rhythm.    Resp: Effort normal and breath sounds normal. No respiratory distress.   GI: Soft. There is no tenderness or guarding. No inguinal hernia. No repercussion of pain in " the flanks  : Normal testicular exam.  MSK: Normal range of motion. no edema.   Neuro: The patient is alert and oriented to person, place, and time.  PERRLA, EOMI, strength in upper/lower extremities normal and symmetrical.   Sensation normal. Speech normal.  GCS eye subscore is 4. GCS verbal subscore is 5. GCS motor subscore is 6.   Skin: Skin is warm and dry. No rash noted.   Psych: normal mood and affect. behavior is normal.       Emergency Department Course   Imaging:  Radiographic findings were communicated with the patient who voiced understanding of the findings.  CT Abdomen/Pelvis w/o IV contrast-stone protocol:   IMPRESSION:   1. No radiodense kidney stones or hydronephrosis.   2. Cholelithiasis without CT evidence of acute cholecystitis.   3. Mild prostatomegaly.   4. Multiple prominent predominantly right lower quadrant mesenteric   lymph nodes, not significantly changed as compared to 1/27/2020 exam,   remain of indeterminate etiology. as per radiology.     Laboratory:  UA: Blood moderate, Protein albumin 10, RBC >182 (H), Mucous present, o/w Negative    Emergency Department Course:  Nursing notes and vitals reviewed. (2007) I performed an exam of the patient as documented above.     Urine sample obtained. This was sent to the lab for further testing, results above.     The patient was sent for a CT while in the emergency department, findings above.     2130 I rechecked the patient and discussed the results of his workup thus far.     Findings and plan explained to the Patient. Patient discharged home with instructions regarding supportive care, medications, and reasons to return. The importance of close follow-up was reviewed.     I personally reviewed the laboratory results with the Patient and answered all related questions prior to discharge.     Impression & Plan    Medical Decision Making:  Romario oHrta is a 35 year old male who presents with blood in the urine. Differential Diagnosis considered  included ureterolithiasis, UTI, urethritis, pyelonephritis, prostate or urogenital cancer, GI bleed etc. Signs and symptoms at this time are consistent with simple hematuria. No clinical signs of kidney stone stone. No sign of urinary obstruction. Patient is hemodynamically stable in the ED. Plan is home with reassessment by urology in 1-2 days or return to ED if symptoms worsen. Return for fevers greater than 102, increasing pain, decreased urination, other new symptoms develop. Questions were answered.    Diagnosis:    ICD-10-CM    1. Hematuria, unspecified type  R31.9    2. Suprapubic pain, acute  R10.2    3. Urethral meatus pain  N36.8        Disposition:  discharged to home  Ramón Carrera  8/14/2020    EMERGENCY DEPARTMENT  Scribe Disclosure:  I, Ramón Carrera, am serving as a scribe at 8:07 PM on 8/14/2020 to document services personally performed by Wolfgang Catherine MD based on my observations and the provider's statements to me.        Wolfgang Catherine MD  08/15/20 0208

## 2024-06-22 ENCOUNTER — HEALTH MAINTENANCE LETTER (OUTPATIENT)
Age: 39
End: 2024-06-22

## 2025-07-12 ENCOUNTER — HEALTH MAINTENANCE LETTER (OUTPATIENT)
Age: 40
End: 2025-07-12